# Patient Record
Sex: FEMALE | Race: WHITE | NOT HISPANIC OR LATINO | Employment: FULL TIME | URBAN - METROPOLITAN AREA
[De-identification: names, ages, dates, MRNs, and addresses within clinical notes are randomized per-mention and may not be internally consistent; named-entity substitution may affect disease eponyms.]

---

## 2018-03-29 ENCOUNTER — TELEPHONE (OUTPATIENT)
Dept: SMOKING CESSATION | Facility: CLINIC | Age: 48
End: 2018-03-29

## 2018-03-29 NOTE — TELEPHONE ENCOUNTER
Left message about missed intake for the tobacco cessation program and asked if the patient would like to reschedule. Left my name Joselo Magaña with phone number [phone].

## 2018-04-04 ENCOUNTER — TELEPHONE (OUTPATIENT)
Dept: SMOKING CESSATION | Facility: CLINIC | Age: 48
End: 2018-04-04

## 2018-04-04 NOTE — TELEPHONE ENCOUNTER
Attempted to contact patient regarding  missed intake for the tobacco cessation program to ask if the patient would like to reschedule. No answer , no voice mail available.

## 2024-02-08 NOTE — PROGRESS NOTES
Date of Service: 2/14/2024    Chief Complaint:   Caitlyn Ag is a 53 y.o. female presenting today due to a worrisome family history of breast cancer.    History of Present Illness:   Mrs. Caitlyn Ag presents on 02/14/24 due to worrisome family history. Her daughter (Lorri Gilbert) recently tested positive for CHEK2  MD::: Quintin Clarke MD    Past Medical History:   Diagnosis Date    Family history of breast cancer in first degree relative 02/09/2024      Past Surgical History:   Procedure Laterality Date    AUGMENTATION OF BREAST Bilateral 2004    CHOLECYSTECTOMY      HYSTERECTOMY      SINUS SURGERY        No current outpatient medications on file.   Review of patient's allergies indicates:   Allergen Reactions    Codeine Other (See Comments)      Social History     Tobacco Use    Smoking status: Every Day     Types: Cigarettes    Smokeless tobacco: Never   Substance Use Topics    Alcohol use: Not Currently     Comment: rare      Family History   Problem Relation Age of Onset    Diabetes Maternal Grandmother     Colon cancer Maternal Grandmother     Diabetes Maternal Grandfather     Lung cancer Maternal Grandfather     Prostate cancer Maternal Grandfather     Diabetes Father     Diabetes Mother     Obesity Mother     Colon cancer Brother     Pancreatic cancer Maternal Aunt     Lung cancer Maternal Aunt     Brain cancer Maternal Aunt     Breast cancer Maternal Aunt         Review of Systems   Integumentary:  Positive for no abnormal findings; Negative for rash, swelling/thickening, and dimpling of the skin  Breast:  Positive for no abnormal findings;  Negative for lump/mass in both breasts     Physical Exam   Constitutional: She appears well-developed. She is cooperative.   HENT: Normocephalic.   Cardiovascular:  Normal rate and regular rhythm.            Pulmonary/Chest: She exhibits no tenderness and no bony tenderness.   Abdominal: Soft. Normal appearance.   Musculoskeletal: Intact; no  deficits  Neurological: She is alert.   Skin: No rash noted.   Breast and Chest Wall Evaluation:  Right breast exhibits no mass, no nipple discharge, no skin change and no tenderness.    Left breast exhibits no mass, no nipple discharge, no skin change and no tenderness.     Lymphadenopathy: No supraclavicular or axillary adenopathy.    MAMMOGRAM REPORT: 9/5/23 There is no mammographic evidence of malignancy.     NOTE:::We viewed her films together at today's visit.  We discussed the multiple views obtained and the important findings.  Even benign changes were mentioned and her questions were answered.  She knows that she may receive a formal letter or report from the Radiologist.  She is to contact us if she has questions.    ASSESSMENT and PLAN OF CARE     1. Family history of breast cancer in first degree relative  Assessment & Plan:  Her daughter has recently been diagnosed with a CHEK2 mutation.  Due to this finding she certainly meets criteria for testing.  We will offer this today.  All implications of testing and been discussed.  She knows that she has the option of meeting with the [facility].      Medical Decision Making: It is my impression that this patient suffers all conditions contained in this medical document.  Each of these conditions did affect our plan of care and my medical decision making today.  It is my opinion that the medical decision making concerning this patient was of moderate difficulty based on the aforementioned conditions.  Any further recommendations will be communicated to the patient by me.  I have reviewed and verified her allergies, list of medications, medical and surgical histories, social history, and a pertinent review of symptoms.     Follow up:  Sept 2024 and prn for [facility] at [facility] and [facility] if gene negative        3/22/2024 8:58 AM ADDENDUM: Her MRI was reviewed by me.  I also reviewed her report and agree with the interpretation.  I phoned this report to her and answered  all questions.  She knows to examine herself monthly and report any changes in the interim.    She has met with Dr. Katz and is leaning toward bilateral mastectomy.    Jarrett Benitez MD, FACS

## 2024-02-09 PROBLEM — Z80.3 FAMILY HISTORY OF BREAST CANCER IN FIRST DEGREE RELATIVE: Status: ACTIVE | Noted: 2024-02-09

## 2024-02-09 NOTE — ASSESSMENT & PLAN NOTE
Her daughter has recently been diagnosed with a CHEK2 mutation.  Due to this finding she certainly meets criteria for testing.  We will offer this today.  All implications of testing and been discussed.  She knows that she has the option of meeting with the [facility].

## 2024-02-14 ENCOUNTER — OFFICE VISIT (OUTPATIENT)
Dept: SURGERY | Facility: CLINIC | Age: 54
End: 2024-02-14
Payer: COMMERCIAL

## 2024-02-14 DIAGNOSIS — Z80.3 FAMILY HISTORY OF BREAST CANCER IN FIRST DEGREE RELATIVE: Primary | ICD-10-CM

## 2024-02-14 PROCEDURE — 1160F RVW MEDS BY RX/DR IN RCRD: CPT | Mod: CPTII,S$GLB,, | Performed by: SPECIALIST

## 2024-02-14 PROCEDURE — 99999 PR PBB SHADOW E&M-EST. PATIENT-LVL II: CPT | Mod: PBBFAC,,, | Performed by: SPECIALIST

## 2024-02-14 PROCEDURE — 99203 OFFICE O/P NEW LOW 30 MIN: CPT | Mod: S$GLB,,, | Performed by: SPECIALIST

## 2024-02-14 PROCEDURE — 1159F MED LIST DOCD IN RCRD: CPT | Mod: CPTII,S$GLB,, | Performed by: SPECIALIST

## 2024-02-14 RX ORDER — METHYLPREDNISOLONE 4 MG/1
4 TABLET ORAL DAILY
COMMUNITY
Start: 2023-12-05 | End: 2024-06-12

## 2024-02-20 ENCOUNTER — TELEPHONE (OUTPATIENT)
Dept: SURGERY | Facility: CLINIC | Age: 54
End: 2024-02-20
Payer: COMMERCIAL

## 2024-02-20 DIAGNOSIS — Z80.3 FAMILY HISTORY OF BREAST CANCER IN FIRST DEGREE RELATIVE: Primary | ICD-10-CM

## 2024-02-20 DIAGNOSIS — Z15.01 GENETIC SUSCEPTIBILITY TO MALIGNANT NEOPLASM OF BREAST: ICD-10-CM

## 2024-02-20 DIAGNOSIS — Z15.01 GENETIC SUSCEPTIBILITY TO MALIGNANT NEOPLASM OF BREAST: Primary | ICD-10-CM

## 2024-02-20 NOTE — TELEPHONE ENCOUNTER
Telephone Encounter     REASON FOR CALL:  CHEK 2 Positive    DISCUSSION: We had a long discussion over the phone considering her new diagnosis of being checked 2 positive.  She knows that this will increase her breast cancer risk to approximately 44%.  It also increase her risk of: Thyroid.  She stated that she has a upcoming colonoscopy scheduled and she will get with her regular doctor concerning her thyroid.    We discussed close follow-up imaging with mammogram alternating with MRI.  We also discussed risk reduction mastectomy.  Her daughter has recently had a bilateral mastectomy with diff Dr. Katz so we will get her in to see him.  I have also recommended that she meet with Dr. Sellers prior to proceeding.  Will also set her up for an MRI in March.    PLAN/RECOMMENDATIONS:   1) MRI of the Breast  2) Referral to Genetics  3) Referral to Plastics    We can then discuss surgical Options.            Jarrett Benitez MD, FACS  2/20/2024 1:55 PM

## 2024-02-27 ENCOUNTER — TELEPHONE (OUTPATIENT)
Dept: SURGERY | Facility: CLINIC | Age: 54
End: 2024-02-27
Payer: COMMERCIAL

## 2024-02-27 NOTE — TELEPHONE ENCOUNTER
ref was sent to Marlo and Gianna. Marlo was also sent the gene testing. Her appointment with Gianna is for 03/05 at 3pm. I scheduled her MRI on 03/05 at 11AM. Patient was made aware of her appt dates and times and advised to look out for a call from Dr. Sellers.

## 2024-04-17 ENCOUNTER — TELEPHONE (OUTPATIENT)
Dept: SURGERY | Facility: CLINIC | Age: 54
End: 2024-04-17
Payer: COMMERCIAL

## 2024-04-17 PROBLEM — Z15.89 CHEK2 GENE MUTATION POSITIVE: Status: ACTIVE | Noted: 2024-04-17

## 2024-04-17 NOTE — ASSESSMENT & PLAN NOTE
We discussed Prophylactic Mastectomy in detail.  We discussed her individual risks of breast cancer given her personal and family history.  She knows that Prophylactic Mastectomy can decrease her risks by 90-95%, but that breast cancer is still possible.  Because of this, ongoing and routine follow-ups are recommended.  We spent time discussing the pros and cons of prophylactic mastectomy along with the R/B/I and alternatives.  She was given the opportunity to visit with a Plastic Surgeon to address reconstruction issues.  She seems to be digesting this information in an appropriate manner.      PLAN::: BILATERAL SIMPLE MASTECTOMY (PROPHYLACTIC) followed by Immediate Reconstruction.

## 2024-04-17 NOTE — PROGRESS NOTES
Date of Service: 4/24/2024    Chief Complaint:   Caitlyn Ag is a 53 y.o. female presenting today to discuss bilateral prophylactic mastectomy.    History of Present Illness:   Mrs. Caitlyn Ag presented on 02/14/24 due to worrisome family history. Her daughter (Lorri Gilbert) recently tested positive for CHEK2 as did Mrs. Ag.  MD::: Quintin Clarke MD    Past Medical History:   Diagnosis Date    Family history of breast cancer in first degree relative 02/09/2024      Past Surgical History:   Procedure Laterality Date    AUGMENTATION OF BREAST Bilateral 2004    CHOLECYSTECTOMY      HYSTERECTOMY      SINUS SURGERY          Current Outpatient Medications:     methylPREDNISolone (MEDROL DOSEPACK) 4 mg tablet, Take 4 mg by mouth once daily., Disp: , Rfl:    Review of patient's allergies indicates:   Allergen Reactions    Codeine Other (See Comments)      Social History     Tobacco Use    Smoking status: Every Day     Types: Cigarettes    Smokeless tobacco: Never   Substance Use Topics    Alcohol use: Not Currently     Comment: rare      Family History   Problem Relation Name Age of Onset    Diabetes Mother      Obesity Mother      Diabetes Father      Colon cancer Brother      Diabetes Maternal Grandmother      Colon cancer Maternal Grandmother      Diabetes Maternal Grandfather      Lung cancer Maternal Grandfather      Prostate cancer Maternal Grandfather      Pancreatic cancer Maternal Aunt      Lung cancer Maternal Aunt      Brain cancer Maternal Aunt      Breast cancer Maternal Aunt      Pancreatic cancer Cousin        Review of Systems   Integumentary: nothing abnormal; Negative for rash, swelling/thickening, and dimpling of the skin  Breast: no abnormal findings;  Negative for lump/mass in both breasts     Physical Exam   Constitutional: She appears well-developed. She is cooperative.   HENT: Normocephalic.   Cardiovascular:  Normal rate and regular rhythm.            Pulmonary/Chest:  She exhibits no tenderness and no bony tenderness.   Abdominal: Soft. Normal appearance.   Musculoskeletal: Intact; no deficits  Neurological: She is alert.   Skin: No rash noted.   Breast and Chest Wall Evaluation:  Right breast exhibits no mass, no nipple discharge, no skin change and no tenderness.    Left breast exhibits no mass, no nipple discharge, no skin change and no tenderness.     Lymphadenopathy: No supraclavicular or axillary adenopathy.    MAMMOGRAM REPORT: 9/5/23 There is no mammographic evidence of malignancy.     MRI BREAST REPORT: March 2024: normal with NEM    ASSESSMENT and PLAN OF CARE     1. CHEK2 gene mutation positive  Assessment & Plan:  We discussed Prophylactic Mastectomy in detail.  We discussed her individual risks of breast cancer given her personal and family history.  She knows that Prophylactic Mastectomy can decrease her risks by 90-95%, but that breast cancer is still possible.  Because of this, ongoing and routine follow-ups are recommended.  We spent time discussing the pros and cons of prophylactic mastectomy along with the R/B/I and alternatives.  She was given the opportunity to visit with a Plastic Surgeon to address reconstruction issues.  She seems to be digesting this information in an appropriate manner.      PLAN::: BILATERAL SIMPLE MASTECTOMY (PROPHYLACTIC) followed by Immediate Reconstruction.        Medical Decision Making: It is my impression that this patient suffers all conditions contained in this medical document.  Each of these conditions did affect our plan of care and my medical decision making today.  It is my opinion that the medical decision making concerning this patient was of moderate difficulty based on the aforementioned conditions.  Any further recommendations will be communicated to the patient by me.  I have reviewed and verified her allergies, list of medications, medical and surgical histories, social history, and a pertinent review of symptoms.      Follow up:  2 weeks and prn for Post Op

## 2024-04-24 ENCOUNTER — OFFICE VISIT (OUTPATIENT)
Dept: SURGERY | Facility: CLINIC | Age: 54
End: 2024-04-24
Payer: COMMERCIAL

## 2024-04-24 DIAGNOSIS — Z15.89 CHEK2 GENE MUTATION POSITIVE: Primary | ICD-10-CM

## 2024-04-24 PROCEDURE — 1159F MED LIST DOCD IN RCRD: CPT | Mod: CPTII,S$GLB,, | Performed by: SPECIALIST

## 2024-04-24 PROCEDURE — 1160F RVW MEDS BY RX/DR IN RCRD: CPT | Mod: CPTII,S$GLB,, | Performed by: SPECIALIST

## 2024-04-24 PROCEDURE — 99999 PR PBB SHADOW E&M-EST. PATIENT-LVL II: CPT | Mod: PBBFAC,,, | Performed by: SPECIALIST

## 2024-04-24 PROCEDURE — 99213 OFFICE O/P EST LOW 20 MIN: CPT | Mod: S$GLB,,, | Performed by: SPECIALIST

## 2024-04-24 RX ORDER — ONABOTULINUMTOXINA 100 [USP'U]/1
INJECTION, POWDER, LYOPHILIZED, FOR SOLUTION INTRADERMAL; INTRAMUSCULAR ONCE
COMMUNITY

## 2024-05-24 ENCOUNTER — OFFICE VISIT (OUTPATIENT)
Dept: SURGERY | Facility: CLINIC | Age: 54
End: 2024-05-24
Payer: COMMERCIAL

## 2024-05-24 DIAGNOSIS — Z15.89 CHEK2 GENE MUTATION POSITIVE: Primary | ICD-10-CM

## 2024-05-24 PROCEDURE — 99999 PR PBB SHADOW E&M-EST. PATIENT-LVL II: CPT | Mod: PBBFAC,,, | Performed by: NURSE PRACTITIONER

## 2024-05-24 PROCEDURE — 99499 UNLISTED E&M SERVICE: CPT | Mod: S$GLB,,, | Performed by: NURSE PRACTITIONER

## 2024-05-24 NOTE — PROGRESS NOTES
Date of Service: 5/24/2024    Chief Complaint:   Caitlyn Ag is a 53 y.o. female presenting today to discuss bilateral prophylactic mastectomy.    History of Present Illness:   Mrs. Caitlyn Ag presented on 02/14/24 due to worrisome family history. Her daughter (Lorri Gilbert) recently tested positive for CHEK2 as did Mrs. Ag.  MD::: Quintin Clarke MD    Past Medical History:   Diagnosis Date    Family history of breast cancer in first degree relative 02/09/2024      Past Surgical History:   Procedure Laterality Date    AUGMENTATION OF BREAST Bilateral 2004    CHOLECYSTECTOMY      HYSTERECTOMY      SINUS SURGERY          Current Outpatient Medications:     methylPREDNISolone (MEDROL DOSEPACK) 4 mg tablet, Take 4 mg by mouth once daily., Disp: , Rfl:     onabotulinumtoxina (BOTOX) 100 unit SolR, Inject into the muscle once., Disp: , Rfl:    Review of patient's allergies indicates:   Allergen Reactions    Codeine Other (See Comments)      Social History     Tobacco Use    Smoking status: Every Day     Types: Cigarettes    Smokeless tobacco: Never   Substance Use Topics    Alcohol use: Not Currently     Comment: rare      Family History   Problem Relation Name Age of Onset    Diabetes Mother      Obesity Mother      Diabetes Father      Colon cancer Brother      Diabetes Maternal Grandmother      Colon cancer Maternal Grandmother      Diabetes Maternal Grandfather      Lung cancer Maternal Grandfather      Prostate cancer Maternal Grandfather      Pancreatic cancer Maternal Aunt      Lung cancer Maternal Aunt      Brain cancer Maternal Aunt      Breast cancer Maternal Aunt      Pancreatic cancer Cousin        Review of Systems   Integumentary: nothing abnormal; Negative for rash, swelling/thickening, and dimpling of the skin  Breast: no abnormal findings;  Negative for lump/mass in both breasts     Physical Exam   Constitutional: She appears well-developed. She is cooperative.   HENT:  Normocephalic.   Cardiovascular:  Normal rate and regular rhythm.            Pulmonary/Chest: She exhibits no tenderness and no bony tenderness.   Abdominal: Soft. Normal appearance.   Musculoskeletal: Intact; no deficits  Neurological: She is alert.   Skin: No rash noted.   Breast and Chest Wall Evaluation:  Right breast exhibits no mass, no nipple discharge, no skin change and no tenderness.    Left breast exhibits no mass, no nipple discharge, no skin change and no tenderness.     Lymphadenopathy: No supraclavicular or axillary adenopathy.    MAMMOGRAM REPORT: 9/5/23 There is no mammographic evidence of malignancy.     MRI BREAST REPORT: March 2024: normal with NEM    ASSESSMENT and PLAN OF CARE     1. CHEK2 gene mutation positive  Assessment & Plan:  We discussed Prophylactic Mastectomy in detail.  We discussed her individual risks of breast cancer given her personal and family history.  She knows that Prophylactic Mastectomy can decrease her risks by 90-95%, but that breast cancer is still possible.  Because of this, ongoing and routine follow-ups are recommended.  We spent time discussing the pros and cons of prophylactic mastectomy along with the R/B/I and alternatives.  She was given the opportunity to visit with a Plastic Surgeon to address reconstruction issues.  She seems to be digesting this information in an appropriate manner.      PLAN::: BILATERAL SIMPLE MASTECTOMY (PROPHYLACTIC) followed by Immediate Reconstruction.        Medical Decision Making: It is my impression that this patient suffers all conditions contained in this medical document.  Each of these conditions did affect our plan of care and my medical decision making today.  It is my opinion that the medical decision making concerning this patient was of moderate difficulty based on the aforementioned conditions.  Any further recommendations will be communicated to the patient by me.  I have reviewed and verified her allergies, list of  medications, medical and surgical histories, social history, and a pertinent review of symptoms.     Follow up:  2 weeks and prn for Post Op

## 2024-05-28 ENCOUNTER — OUTSIDE PLACE OF SERVICE (OUTPATIENT)
Dept: SURGERY | Facility: CLINIC | Age: 54
End: 2024-05-28
Payer: COMMERCIAL

## 2024-06-12 ENCOUNTER — OFFICE VISIT (OUTPATIENT)
Dept: SURGERY | Facility: CLINIC | Age: 54
End: 2024-06-12
Payer: COMMERCIAL

## 2024-06-12 DIAGNOSIS — Z15.89 CHEK2 GENE MUTATION POSITIVE: Primary | ICD-10-CM

## 2024-06-12 PROCEDURE — 1159F MED LIST DOCD IN RCRD: CPT | Mod: CPTII,S$GLB,, | Performed by: NURSE PRACTITIONER

## 2024-06-12 PROCEDURE — 1160F RVW MEDS BY RX/DR IN RCRD: CPT | Mod: CPTII,S$GLB,, | Performed by: NURSE PRACTITIONER

## 2024-06-12 PROCEDURE — 99024 POSTOP FOLLOW-UP VISIT: CPT | Mod: S$GLB,,, | Performed by: NURSE PRACTITIONER

## 2024-06-12 PROCEDURE — 99999 PR PBB SHADOW E&M-EST. PATIENT-LVL III: CPT | Mod: PBBFAC,,, | Performed by: NURSE PRACTITIONER

## 2024-06-12 RX ORDER — TALC
6 POWDER (GRAM) TOPICAL
COMMUNITY

## 2024-06-12 RX ORDER — OXYCODONE HYDROCHLORIDE AND ACETAMINOPHEN 10; 325 MG/1; MG/1
1 TABLET ORAL
COMMUNITY
Start: 2024-06-07

## 2024-06-12 RX ORDER — CYCLOBENZAPRINE HCL 10 MG
10 TABLET ORAL EVERY 8 HOURS PRN
COMMUNITY
Start: 2024-06-07

## 2024-06-12 RX ORDER — ONDANSETRON 4 MG/1
4 TABLET, ORALLY DISINTEGRATING ORAL EVERY 8 HOURS PRN
COMMUNITY
Start: 2024-06-07

## 2024-06-12 NOTE — ASSESSMENT & PLAN NOTE
She is doing great post operatively and will alert me to any issues. Pathology reports were discussed in detail. All questions were answered and recommendations were made for future treatments/follow-up. We discussed the importance of following up with all Specialists for adjuvant treatment recommendations.=

## 2024-06-12 NOTE — PROGRESS NOTES
[facility]  Jarrett Benitez MD, FACS  Enmanuel Goldman NP-C      Date of Service: 6/12/2024    Chief Complaint:   Caitlyn Ag is a 53 y.o. female presenting today for a post operative visit.  She is status post bilateral prophylactic simple mastectomy with LYSSA flap.  She has done well post operatively and has no complaints.    History of Present Illness:   Mrs. Caitlyn Ag presented on 02/14/24 due to worrisome family history. Her daughter (Lorri Gilbert) recently tested positive for CHEK2 as did Mrs. Ag.  She elected bilateral prophylactic mastectomy that showed benign changes with   NEM. MD::: Quintin Clarke MD     Past Medical History:   Diagnosis Date    CHEK2 positive     Family history of breast cancer in first degree relative 02/09/2024      Past Surgical History:   Procedure Laterality Date    AUGMENTATION OF BREAST Bilateral 2004    Bilateral prophylactic mastectomy w/ LYSSA flap 5/28/2024      CHOLECYSTECTOMY      HYSTERECTOMY      SINUS SURGERY          Current Outpatient Medications:     cyclobenzaprine (FLEXERIL) 10 MG tablet, Take 10 mg by mouth every 8 (eight) hours as needed., Disp: , Rfl:     ondansetron (ZOFRAN-ODT) 4 MG TbDL, Take 4 mg by mouth every 8 (eight) hours as needed., Disp: , Rfl:     PERCOCET  mg per tablet, Take 1 tablet by mouth., Disp: , Rfl:     melatonin (MELATIN) 3 mg tablet, Take 6 mg by mouth., Disp: , Rfl:     onabotulinumtoxina (BOTOX) 100 unit SolR, Inject into the muscle once., Disp: , Rfl:   No current facility-administered medications for this visit.   Review of patient's allergies indicates:   Allergen Reactions    Codeine Other (See Comments)      Social History     Tobacco Use    Smoking status: Every Day     Types: Cigarettes    Smokeless tobacco: Never   Substance Use Topics    Alcohol use: Not Currently     Comment: rare      Family History   Problem Relation Name Age of Onset    Diabetes Mother       Obesity Mother      Diabetes Father      Colon cancer Brother      Diabetes Maternal Grandmother      Colon cancer Maternal Grandmother      Diabetes Maternal Grandfather      Lung cancer Maternal Grandfather      Prostate cancer Maternal Grandfather      Pancreatic cancer Maternal Aunt      Lung cancer Maternal Aunt      Brain cancer Maternal Aunt      Breast cancer Maternal Aunt      Pancreatic cancer Cousin          Review of Systems   Constitutional:  Positive for activity change (slowed down after surgery). Negative for fever.   Respiratory:  Negative for shortness of breath.    Cardiovascular:  Negative for chest pain.   Integumentary:  Positive for breast tenderness (typical post operative). Negative for rash and wound.   Breast: Positive for tenderness (typical post operative).       Physical Exam   Constitutional: She is cooperative.   Pulmonary/Chest: Effort normal. She exhibits tenderness. She exhibits no mass and no swelling. Right breast exhibits no mass, no skin change and no tenderness. Left breast exhibits no mass, no skin change and no tenderness. No breast swelling.   Left- Surgically absent with NEM/Right- Surgically absent with NEM; Normal post op changes bilaterally.   Abdominal: Normal appearance.   Genitourinary: No breast swelling.   Musculoskeletal: Lymphadenopathy:      Upper Body:      Right upper body: No supraclavicular or axillary adenopathy.      Left upper body: No supraclavicular or axillary adenopathy.     Neurological: She is alert.   Skin: No rash noted.          ASSESSMENT and PLAN OF CARE     1. CHEK2 gene mutation positive  Assessment & Plan:   She is doing great post operatively and will alert me to any issues. Pathology reports were discussed in detail. All questions were answered and recommendations were made for future treatments/follow-up. We discussed the importance of following up with all Specialists for adjuvant treatment recommendations.=       Medical Decision  Making:  It is my impression that this patient suffers all conditions contained in this medical document.  Each of these conditions did affect our plan of care and my medical decision making today.  It is my opinion that the medical decision making concerning this patient was of minimal  difficulty based on the aforementioned conditions.  Any further recommendations will be communicated to the patient by me.  I have reviewed and verified her allergies, list of medications, medical and surgical histories, social history, and a pertinent review of symptoms.     Follow up: 6 months and prn    For: Physical Examination

## 2024-07-22 ENCOUNTER — PATIENT MESSAGE (OUTPATIENT)
Dept: HEMATOLOGY/ONCOLOGY | Facility: CLINIC | Age: 54
End: 2024-07-22
Payer: COMMERCIAL

## 2024-07-23 ENCOUNTER — PATIENT MESSAGE (OUTPATIENT)
Dept: HEMATOLOGY/ONCOLOGY | Facility: CLINIC | Age: 54
End: 2024-07-23
Payer: COMMERCIAL

## 2024-07-23 ENCOUNTER — TELEPHONE (OUTPATIENT)
Dept: HEMATOLOGY/ONCOLOGY | Facility: CLINIC | Age: 54
End: 2024-07-23
Payer: COMMERCIAL

## 2024-07-23 NOTE — TELEPHONE ENCOUNTER
Caitlyn received her mother's genetic testing results. 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     This means that the CHEK2 variant was either inherited from Caitlyn's father or is a de ilsa mutation. Her paternal half-siblings and paternal cousins should be tested. I provided her with the [email or web address] website so her relatives, who all live in [place], can locate a genetics provider.     Caitlyn's mother's testing included all of the pancreatic cancer genes. A negative result does not rule out the possibility that her maternal relatives with pancreatic cancer had a mutation her mother didn't inherit. Her maternal cousins should all consider testing to include all of the pancreatic cancer genes.